# Patient Record
Sex: FEMALE | Race: WHITE | NOT HISPANIC OR LATINO | URBAN - METROPOLITAN AREA
[De-identification: names, ages, dates, MRNs, and addresses within clinical notes are randomized per-mention and may not be internally consistent; named-entity substitution may affect disease eponyms.]

---

## 2024-10-19 ENCOUNTER — EMERGENCY (EMERGENCY)
Facility: HOSPITAL | Age: 24
LOS: 1 days | Discharge: HOME | End: 2024-10-19
Attending: EMERGENCY MEDICINE
Payer: MEDICAID

## 2024-10-19 VITALS
TEMPERATURE: 98 F | OXYGEN SATURATION: 100 % | DIASTOLIC BLOOD PRESSURE: 95 MMHG | RESPIRATION RATE: 18 BRPM | WEIGHT: 196.87 LBS | HEART RATE: 90 BPM | HEIGHT: 67 IN | SYSTOLIC BLOOD PRESSURE: 133 MMHG

## 2024-10-19 PROCEDURE — 99284 EMERGENCY DEPT VISIT MOD MDM: CPT | Mod: 25

## 2024-10-19 PROCEDURE — 96374 THER/PROPH/DIAG INJ IV PUSH: CPT

## 2024-10-19 PROCEDURE — 96375 TX/PRO/DX INJ NEW DRUG ADDON: CPT

## 2024-10-19 PROCEDURE — 99284 EMERGENCY DEPT VISIT MOD MDM: CPT

## 2024-10-19 RX ORDER — FAMOTIDINE 40 MG
20 TABLET ORAL ONCE
Refills: 0 | Status: COMPLETED | OUTPATIENT
Start: 2024-10-19 | End: 2024-10-19

## 2024-10-19 RX ORDER — DIPHENHYDRAMINE HCL 12.5MG/5ML
25 LIQUID (ML) ORAL ONCE
Refills: 0 | Status: COMPLETED | OUTPATIENT
Start: 2024-10-19 | End: 2024-10-19

## 2024-10-19 RX ORDER — DIPHENHYDRAMINE HCL 12.5MG/5ML
1 LIQUID (ML) ORAL
Qty: 30 | Refills: 0
Start: 2024-10-19

## 2024-10-19 RX ORDER — PREDNISONE 5 MG/1
1 TABLET ORAL
Qty: 4 | Refills: 0
Start: 2024-10-19 | End: 2024-10-22

## 2024-10-19 RX ORDER — METHYLPREDNISOLONE ACETATE 80 MG/ML
125 INJECTION, SUSPENSION INTRA-ARTICULAR; INTRALESIONAL; INTRAMUSCULAR; SOFT TISSUE ONCE
Refills: 0 | Status: COMPLETED | OUTPATIENT
Start: 2024-10-19 | End: 2024-10-19

## 2024-10-19 RX ADMIN — Medication 25 MILLIGRAM(S): at 03:42

## 2024-10-19 RX ADMIN — METHYLPREDNISOLONE ACETATE 125 MILLIGRAM(S): 80 INJECTION, SUSPENSION INTRA-ARTICULAR; INTRALESIONAL; INTRAMUSCULAR; SOFT TISSUE at 03:41

## 2024-10-19 RX ADMIN — Medication 20 MILLIGRAM(S): at 03:42

## 2024-10-19 NOTE — ED ADULT TRIAGE NOTE - CHIEF COMPLAINT QUOTE
pt stated she has hives on her body that started at 4:30pm yesterday she took benadryl, it started to get better, but at 11pm it started to guilherme worse and she took benadryl around 12mn, denies having problems swallowing or breathing.

## 2024-10-19 NOTE — ED PROVIDER NOTE - PATIENT PORTAL LINK FT
You can access the FollowMyHealth Patient Portal offered by [facility] by registering at the following website: [email or web address]. By joining [facility]’s FollowMyHealth portal, you will also be able to view your health information using other applications (apps) compatible with our system.

## 2024-10-19 NOTE — ED ADULT NURSE NOTE - NSFALLUNIVINTERV_ED_ALL_ED
Bed/Stretcher in lowest position, wheels locked, appropriate side rails in place/Call bell, personal items and telephone in reach/Instruct patient to call for assistance before getting out of bed/chair/stretcher/Non-slip footwear applied when patient is off stretcher/Duff to call system/Physically safe environment - no spills, clutter or unnecessary equipment/Purposeful proactive rounding/Room/bathroom lighting operational, light cord in reach

## 2024-10-19 NOTE — ED PROVIDER NOTE - OBJECTIVE STATEMENT
24-year-old female who denies any significant past medical history presents ED with hives and itching.  As per patient's symptoms started this afternoon on her way home from work.  Patient denies any new foods, no new clothes, no new detergents, no new perfumes.  Around 4:00 patient took a Benadryl to help with itching and hives symptoms which helped.  Later in the evening the symptoms recurred and around midnight patient took another Benadryl.  Patient says she found no relief from the second Benadryl which is why she came to ED.  No lip swelling no tongue swelling no change in voice patient able to tolerate her secretions.  Patient denies similar symptoms previously

## 2024-10-19 NOTE — ED PROVIDER NOTE - PROGRESS NOTE DETAILS
Patient reassessed and reports improvement in symptoms.  Will DC with supportive care and PMD follow-up.

## 2024-10-19 NOTE — ED ADULT TRIAGE NOTE - WEIGHT METHOD
Left message for patient regarding upcoming appointment on 4/14/17 at 9:30am.  Informed patient to bring an updated list of allergies, medications, pharmacy details and insurance information. Asked patient to bring any dermatology records from outside [place] or the [facility] or have them faxed to [phone].    Patient Reminders Given:  --Plan on being in our facility for approximately one hour, this includes the registration process, office visit, education and check-out process.  If you are having a procedure, more time may be required.     --If you are having a procedure, please, present 15 minutes early.  --We are located on the second floor of the building, check in desk 4.   --If you need to cancel or reschedule, call [phone].  --We look forward to seeing you in Dermatology Clinic.       Parker Neal Medical Assistant     
actual

## 2024-10-19 NOTE — ED PROVIDER NOTE - CLINICAL SUMMARY MEDICAL DECISION MAKING FREE TEXT BOX
24-year-old female presents to ED with itching and hives and likely allergic reaction.  Unknown exposure.  No lip or tongue swelling patient breathing comfortably.  Will treat with Benadryl, Pepcid, Solu-Medrol, reassess

## 2024-10-19 NOTE — ED ADULT NURSE NOTE - OBJECTIVE STATEMENT
Patient is a 25y/o female presenting to the ED c/o pt stated she has hives on her body that started at 4:30pm yesterday she took benadryl, it started to get better, but at 11pm it started to guilherme worse and she took benadryl around 12mn, denies having problems swallowing or breathing.